# Patient Record
Sex: FEMALE | Employment: FULL TIME | URBAN - METROPOLITAN AREA
[De-identification: names, ages, dates, MRNs, and addresses within clinical notes are randomized per-mention and may not be internally consistent; named-entity substitution may affect disease eponyms.]

---

## 2021-05-06 ENCOUNTER — HOSPITAL ENCOUNTER (EMERGENCY)
Facility: CLINIC | Age: 21
Discharge: HOME | End: 2021-05-06
Attending: EMERGENCY MEDICINE | Admitting: EMERGENCY MEDICINE
Payer: COMMERCIAL

## 2021-05-06 VITALS
HEART RATE: 94 BPM | DIASTOLIC BLOOD PRESSURE: 88 MMHG | TEMPERATURE: 99 F | OXYGEN SATURATION: 99 % | SYSTOLIC BLOOD PRESSURE: 129 MMHG | RESPIRATION RATE: 16 BRPM

## 2021-05-06 DIAGNOSIS — H65.92 OME (OTITIS MEDIA WITH EFFUSION), LEFT: ICD-10-CM

## 2021-05-06 PROCEDURE — 99283 EMERGENCY DEPT VISIT LOW MDM: CPT

## 2021-05-06 PROCEDURE — 250N000013 HC RX MED GY IP 250 OP 250 PS 637: Performed by: EMERGENCY MEDICINE

## 2021-05-06 RX ORDER — IBUPROFEN 600 MG/1
600 TABLET, FILM COATED ORAL ONCE
Status: COMPLETED | OUTPATIENT
Start: 2021-05-06 | End: 2021-05-06

## 2021-05-06 RX ORDER — IBUPROFEN 800 MG/1
800 TABLET, FILM COATED ORAL EVERY 8 HOURS PRN
Qty: 60 TABLET | Refills: 0 | Status: SHIPPED | OUTPATIENT
Start: 2021-05-06 | End: 2021-05-14

## 2021-05-06 RX ADMIN — AMOXICILLIN AND CLAVULANATE POTASSIUM 1 TABLET: 875; 125 TABLET, FILM COATED ORAL at 03:13

## 2021-05-06 RX ADMIN — IBUPROFEN 600 MG: 600 TABLET ORAL at 03:13

## 2021-05-06 ASSESSMENT — ENCOUNTER SYMPTOMS
FEVER: 0
COUGH: 1

## 2021-05-06 NOTE — DISCHARGE INSTRUCTIONS
Please ibuprofen and/or Tylenol for pain.  Use antibiotic 10-day course.  Please follow-up in the clinic if is not better with medications as prescribed.

## 2021-05-06 NOTE — ED PROVIDER NOTES
History   Chief Complaint:  Left Ear Pain    The history is provided by the patient.     Anaya Whitehead is a 20 year old female who presents for evaluation of left ear pain. She reports one day of ear pain which acutely worsened around 2100. She tried using OTC ear drops without relief of her symptoms. As she was unable to fall asleep secondary to the pain, she presented to the ED. Here, she denies pain to her external ear. She does report a cough and states she is on the mend from COVID-19 after being diagnosed 10 days ago. She has not had a fever recently. She does report a history of ear infections and had PE tubes placed as a child.     Allergies:  No Known Allergies    Medications:    Levalbuterol inhaler     Past Medical History:    Asthma  COVID-19    Past Surgical History:    PE tubes    Family History:    Father: Asthma    Social History:  Presents with a friend.   Denies tobacco use.     Review of Systems   Constitutional: Negative for fever.   HENT: Positive for ear pain.    Respiratory: Positive for cough.    All other systems reviewed and are negative.      Physical Exam     Patient Vitals for the past 24 hrs:   BP Temp Temp src Pulse Resp SpO2   05/06/21 0314 129/88 99  F (37.2  C) Oral 94 16 99 %   05/06/21 0122 125/82 98.7  F (37.1  C) Temporal 96 18 98 %        Physical Exam  Vitals signs and nursing note reviewed.   HENT:      Right Ear: Tympanic membrane normal.      Ears:      Comments: Left TM: There is dullness and retraction of the tympanic membrane with mucopurulent discharge.  There is no edema or erythema of the ear canal.  No tenderness with palpation of the pinna or auricle.  Cardiovascular:      Rate and Rhythm: Normal rate and regular rhythm.   Pulmonary:      Effort: Pulmonary effort is normal.      Breath sounds: Normal breath sounds.   Neurological:      Mental Status: She is alert.       Emergency Department Course   Emergency Department Course:  Reviewed:  I reviewed the  patient's nursing notes, vitals, past medical records, and Care Everywhere.     Assessments:  0251: I performed an exam of the patient, as documented above. History obtained and my exam findings discussed, as well as recommendations for home management.     Interventions:  0313: ibuprofen, 600 mg, PO  0313: Augmentin, 1 tablet, PO    Disposition:  The patient was discharged to home.     Impression & Plan      Medical Decision Making:   Anaya Whitehead is a 20 year old female who presents with left ear pain.  Physical exam is consistent with otitis media.  Patient was offered antibiotics ibuprofen and follow-up for reassessment and return with worsening condition.    Diagnosis:     ICD-10-CM    1. OME (otitis media with effusion), left  H65.92        Discharge Medications:  New Prescriptions    AMOXICILLIN-CLAVULANATE (AUGMENTIN) 875-125 MG TABLET    Take 1 tablet by mouth 2 times daily for 10 days    IBUPROFEN (ADVIL/MOTRIN) 800 MG TABLET    Take 1 tablet (800 mg) by mouth every 8 hours as needed for moderate pain      Scribe Disclosure:  I, Chantelle Breen, am serving as a scribe on 5/6/2021 at 2:53 AM to personally document services performed by Mayito Sherman MD based on my observations and the provider's statements to me.      5/6/2021   EMERGENCY DEPARTMENT     Mayito Sherman MD  05/09/21 [id number]

## 2021-05-06 NOTE — ED TRIAGE NOTES
Pt to ER with c/o left ear pain, pt was dx with civid 10 days ago and was just beginning to feel better now ear pain, pt with hx of ear inf

## 2021-10-17 ENCOUNTER — HEALTH MAINTENANCE LETTER (OUTPATIENT)
Age: 21
End: 2021-10-17

## 2022-10-03 ENCOUNTER — HEALTH MAINTENANCE LETTER (OUTPATIENT)
Age: 22
End: 2022-10-03

## 2023-02-11 ENCOUNTER — HEALTH MAINTENANCE LETTER (OUTPATIENT)
Age: 23
End: 2023-02-11

## 2024-03-09 ENCOUNTER — HEALTH MAINTENANCE LETTER (OUTPATIENT)
Age: 24
End: 2024-03-09